# Patient Record
Sex: FEMALE | Race: ASIAN | NOT HISPANIC OR LATINO | URBAN - METROPOLITAN AREA
[De-identification: names, ages, dates, MRNs, and addresses within clinical notes are randomized per-mention and may not be internally consistent; named-entity substitution may affect disease eponyms.]

---

## 2019-03-19 PROBLEM — J34.89 NASAL OBSTRUCTION: Status: ACTIVE | Noted: 2019-03-19

## 2019-03-19 PROBLEM — J34.2 DEVIATED NASAL SEPTUM: Status: ACTIVE | Noted: 2019-03-19

## 2019-03-19 PROBLEM — J04.0 REFLUX LARYNGITIS: Status: ACTIVE | Noted: 2019-03-19

## 2019-03-19 PROBLEM — K21.9 REFLUX LARYNGITIS: Status: ACTIVE | Noted: 2019-03-19

## 2019-03-19 PROBLEM — J30.89 NON-SEASONAL ALLERGIC RHINITIS: Status: ACTIVE | Noted: 2019-03-19

## 2019-03-19 PROBLEM — J34.3 HYPERTROPHY OF BOTH INFERIOR NASAL TURBINATES: Status: ACTIVE | Noted: 2019-03-19

## 2019-04-28 PROBLEM — J31.0 RHINOSINUSITIS: Status: ACTIVE | Noted: 2019-04-28

## 2019-04-28 PROBLEM — R76.8 ANA POSITIVE: Status: ACTIVE | Noted: 2019-04-28

## 2019-04-28 PROBLEM — J32.9 RHINOSINUSITIS: Status: ACTIVE | Noted: 2019-04-28

## 2019-04-28 PROBLEM — K90.41 NCGS (NON-CELIAC GLUTEN SENSITIVITY): Status: ACTIVE | Noted: 2019-04-28

## 2022-02-23 ENCOUNTER — HOSPITAL ENCOUNTER (OUTPATIENT)
Dept: RADIOLOGY | Facility: HOSPITAL | Age: 47
Discharge: HOME | End: 2022-02-23
Payer: COMMERCIAL

## 2022-02-23 DIAGNOSIS — R09.81 CHRONIC NASAL CONGESTION: ICD-10-CM

## 2022-02-23 DIAGNOSIS — J34.2 NASAL SEPTAL DEVIATION: ICD-10-CM

## 2022-02-23 DIAGNOSIS — R51.9 CHRONIC NONINTRACTABLE HEADACHE, UNSPECIFIED HEADACHE TYPE: ICD-10-CM

## 2022-02-23 DIAGNOSIS — G89.29 CHRONIC NONINTRACTABLE HEADACHE, UNSPECIFIED HEADACHE TYPE: ICD-10-CM

## 2022-02-23 PROCEDURE — 70486 CT MAXILLOFACIAL W/O DYE: CPT

## 2022-09-16 ENCOUNTER — OFFICE VISIT (OUTPATIENT)
Dept: PODIATRY | Facility: CLINIC | Age: 47
End: 2022-09-16
Payer: COMMERCIAL

## 2022-09-16 VITALS
HEIGHT: 64 IN | HEART RATE: 97 BPM | SYSTOLIC BLOOD PRESSURE: 114 MMHG | DIASTOLIC BLOOD PRESSURE: 80 MMHG | WEIGHT: 154.4 LBS | BODY MASS INDEX: 26.36 KG/M2

## 2022-09-16 DIAGNOSIS — M72.2 PLANTAR FASCIITIS: Primary | ICD-10-CM

## 2022-09-16 PROCEDURE — 99202 OFFICE O/P NEW SF 15 MIN: CPT | Performed by: PODIATRIST

## 2022-09-16 NOTE — PATIENT INSTRUCTIONS
Powerstep Dillon 3/4 length insoles for arch support; posterior night splint for passive stretching of the plantar fascia and Achilles tendon

## 2022-09-16 NOTE — PROGRESS NOTES
Assessment/Plan:    The patient's clinical examination is consistent with plantar fasciitis of the right heel  We discussed the etiology treatment goals for plantar fasciitis  As her symptomatology appears to be relatively mild and intermittent we will proceed with conservative therapy  We discussed proper stretching exercises and proper supportive shoe gear  I recommended some over-the-counter arch supports  She may benefit from use of a posterior night splint was also recommended  Over-the-counter NSAID therapy as needed can also be helpful  Some stretching exercises were included in her aftercare summary  She will follow-up with me in 4-6 weeks for follow-up  Diagnoses and all orders for this visit:    Plantar fasciitis          Subjective:      Patient ID: Lc Gallagher is a 51 y o  female  The patient presents today with a chief complaint of right heel pain that has been present for about 1-2 months  She denies any recent injury or trauma to the right heel  But she has been involved in playing tennis and golf  She states her pain is intermittent and does not her every day but does seem to be activity related  When the pain does occurred it does tend to be worse with weight-bearing after periods of rest   She has tried stretches sizes at home, and wearing accommodative/supportive shoe gear  The following portions of the patient's history were reviewed and updated as appropriate: allergies, current medications, past family history, past medical history, past social history, past surgical history and problem list       PAST MEDICAL HISTORY:  Past Medical History:   Diagnosis Date    Allergic rhinitis        PAST SURGICAL HISTORY:  Past Surgical History:   Procedure Laterality Date     SECTION      LASIK      RETINAL DETACHMENT SURGERY      TEAR IN RETINA        ALLERGIES:  Patient has no known allergies      MEDICATIONS:  Current Outpatient Medications   Medication Sig Dispense Refill    acetaminophen (TYLENOL) 500 mg tablet Take 500 mg by mouth every 6 (six) hours as needed for mild pain or moderate pain      azelastine (ASTELIN) 0 1 % nasal spray 2 sprays into each nostril 2 (two) times a day 30 mL 11    diphenhydrAMINE-acetaminophen (TYLENOL PM)  MG TABS Take 1 tablet by mouth daily at bedtime as needed for sleep      fluticasone (FLONASE) 50 mcg/act nasal spray 1 spray in each nostril bid for 2 weeks, then 1 spray each nostril daily thereafter 1 Bottle 3    ibuprofen (MOTRIN) 400 mg tablet Take 400 mg by mouth every 6 (six) hours as needed for moderate pain      Loratadine (CLARITIN PO) Take by mouth       No current facility-administered medications for this visit  SOCIAL HISTORY:  Social History     Socioeconomic History    Marital status: /Civil Union     Spouse name: None    Number of children: None    Years of education: None    Highest education level: None   Occupational History    None   Tobacco Use    Smoking status: Never Smoker    Smokeless tobacco: None   Substance and Sexual Activity    Alcohol use: Yes    Drug use: Never    Sexual activity: None   Other Topics Concern    None   Social History Narrative    None     Social Determinants of Health     Financial Resource Strain: Not on file   Food Insecurity: Not on file   Transportation Needs: Not on file   Physical Activity: Not on file   Stress: Not on file   Social Connections: Not on file   Intimate Partner Violence: Not on file   Housing Stability: Not on file        Review of Systems   Constitutional: Negative for chills and fever  HENT: Negative for ear pain and sore throat  Eyes: Negative for pain and visual disturbance  Respiratory: Negative for cough and shortness of breath  Cardiovascular: Negative for chest pain and palpitations  Gastrointestinal: Negative for abdominal pain and vomiting  Genitourinary: Negative for dysuria and hematuria  Musculoskeletal: Negative for arthralgias and back pain  Skin: Negative for color change and rash  Neurological: Negative for seizures and syncope  Psychiatric/Behavioral: Negative  All other systems reviewed and are negative  Objective:      /80   Pulse 97   Ht 5' 4" (1 626 m) Comment: verbal  Wt 70 kg (154 lb 6 4 oz)   BMI 26 50 kg/m²          Physical Exam  Constitutional:       Appearance: Normal appearance  HENT:      Head: Normocephalic and atraumatic  Nose: Nose normal    Cardiovascular:      Pulses:           Dorsalis pedis pulses are 2+ on the right side  Posterior tibial pulses are 2+ on the right side  Pulmonary:      Effort: Pulmonary effort is normal    Musculoskeletal:        Feet:    Feet:      Comments: (+) TTP of the plantar medial tubercle of the right os calcis; no pain with lateral squeeze of the right heel, no ecchymosis, no edema, no calor, no erythema to the right foot  Skin:     General: Skin is warm  Capillary Refill: Capillary refill takes less than 2 seconds  Neurological:      General: No focal deficit present  Mental Status: She is alert and oriented to person, place, and time  Psychiatric:         Mood and Affect: Mood normal          Behavior: Behavior normal          Thought Content:  Thought content normal

## 2023-05-03 ENCOUNTER — PROCEDURE VISIT (OUTPATIENT)
Age: 48
End: 2023-05-03

## 2023-05-03 VITALS
OXYGEN SATURATION: 99 % | BODY MASS INDEX: 29.02 KG/M2 | WEIGHT: 170 LBS | DIASTOLIC BLOOD PRESSURE: 77 MMHG | HEIGHT: 64 IN | SYSTOLIC BLOOD PRESSURE: 122 MMHG | HEART RATE: 81 BPM

## 2023-05-03 DIAGNOSIS — M54.2 NECK PAIN: Primary | ICD-10-CM

## 2023-05-03 DIAGNOSIS — M54.59 MECHANICAL LOW BACK PAIN: ICD-10-CM

## 2023-05-03 DIAGNOSIS — M54.6 ACUTE RIGHT-SIDED THORACIC BACK PAIN: ICD-10-CM

## 2023-05-03 DIAGNOSIS — M79.18 MYOFASCIAL PAIN: ICD-10-CM

## 2023-05-03 RX ORDER — ATOVAQUONE AND PROGUANIL HYDROCHLORIDE 250; 100 MG/1; MG/1
1 TABLET, FILM COATED ORAL
COMMUNITY
Start: 2023-03-01

## 2023-05-03 RX ORDER — ESCITALOPRAM OXALATE 5 MG/1
5 TABLET ORAL DAILY
COMMUNITY
Start: 2023-03-27

## 2023-05-08 NOTE — PROGRESS NOTES
HPI:  Oneil West presents for treatment today increase neck upper back pain and tightness  Low back tightness right greater than left  Denies any radicular complaints to the upper or lower extremity  Denies any numbness or tingling  Denies any weakness  Reports no associated trauma or exacerbating factors however she did have a lengthy trip to [place] which seemed to have tighten things up  She is stretching on a regular basis  She is playing tennis  Denies any fever chills sweats night pain pain upon recumbency  Reports no bowel bladder changes  The following portions of the patient's history were reviewed and updated as appropriate: allergies, current medications, past family history, past medical history, past social history, past surgical history, and problem list     Review of Systems    Physical Exam:  Exam reveals active triggers in the right trapezius, levator scapula, and rhomboid musculature  Joint dysfunction T4-T5 C2-C3  Stable upper extremity root evaluation  Pelvic obliquity leg with inequality biomechanical joint dysfunction present in the right sacroiliac joint L5-S1 motion unit  Assessment:   Diagnosis ICD-10-CM Associated Orders   1  Neck pain  M54 2       2  Myofascial pain  M79 18       3  Acute right-sided thoracic back pain  M54 6       4  Mechanical low back pain  M54 59                 Treatment: 56133  Manipulation right innominate, sacrum, L5 via Kauffman drop maneuver  Manipulation T4 C5 C2 well-tolerated      Discussion:  Continue daily stretching see her back for 2-week follow-up

## 2023-06-07 ENCOUNTER — TELEPHONE (OUTPATIENT)
Age: 48
End: 2023-06-07

## 2023-06-08 ENCOUNTER — PROCEDURE VISIT (OUTPATIENT)
Age: 48
End: 2023-06-08
Payer: COMMERCIAL

## 2023-06-08 VITALS — SYSTOLIC BLOOD PRESSURE: 128 MMHG | DIASTOLIC BLOOD PRESSURE: 82 MMHG | BODY MASS INDEX: 29.18 KG/M2 | HEIGHT: 64 IN

## 2023-06-08 DIAGNOSIS — M54.59 MECHANICAL LOW BACK PAIN: ICD-10-CM

## 2023-06-08 DIAGNOSIS — M54.6 ACUTE RIGHT-SIDED THORACIC BACK PAIN: ICD-10-CM

## 2023-06-08 DIAGNOSIS — M54.2 NECK PAIN: Primary | ICD-10-CM

## 2023-06-08 DIAGNOSIS — M79.18 MYOFASCIAL PAIN: ICD-10-CM

## 2023-06-08 PROCEDURE — 98942 CHIROPRACTIC MANJ 5 REGIONS: CPT | Performed by: CHIROPRACTOR

## 2023-06-08 PROCEDURE — 97140 MANUAL THERAPY 1/> REGIONS: CPT | Performed by: CHIROPRACTOR

## 2023-06-08 NOTE — PROGRESS NOTES
"    HPI:  Carley Chung presents for treatment today increase neck upper back pain and tightness  Low back tightness right greater than left  Denies any radicular complaints to the upper or lower extremity  Denies any numbness or tingling  Denies any weakness  VPAS  3-4    She reports as of late she feels \"out of whack\"  Noticing some increased low back pain jolts when rolling over in bed  She has been doing a good number of barre classes  In addition she feels tight and stiff in the neck and upper back  Denies any fever chills sweats night pain pain upon recumbency  Reports no bowel bladder changes  The following portions of the patient's history were reviewed and updated as appropriate: allergies, current medications, past family history, past medical history, past social history, past surgical history, and problem list     Review of Systems    Physical Exam:  Exam reveals active triggers in the right trapezius, levator scapula, and rhomboid musculature  Joint dysfunction T4-T5 C2-C3  Stable upper extremity root evaluation  Pelvic obliquity leg with inequality biomechanical joint dysfunction present in the right sacroiliac joint L5-S1 motion unit  Assessment:   Diagnosis ICD-10-CM Associated Orders   1  Neck pain  M54 2       2  Myofascial pain  M79 18       3  Acute right-sided thoracic back pain  M54 6       4  Mechanical low back pain  M54 59                 Treatment: [id number]  Manipulation right innominate, sacrum, L5 via Kauffman drop maneuver  Manipulation T4 C5 C2 well-tolerated  Myofascial release therapeutically to the bilateral shoulder girdles neck and thoracic spine utilizing Graston technique  I used GT 2, GT 3, and GT 4 instruments  These were all well-tolerated  Soft tissues were mobilized and addressed well-tolerated by the patient today 12-minute procedure      Discussion:  Continue daily stretching see her back for 4-week follow-up  "

## 2023-07-06 ENCOUNTER — PROCEDURE VISIT (OUTPATIENT)
Age: 48
End: 2023-07-06
Payer: COMMERCIAL

## 2023-07-06 VITALS — SYSTOLIC BLOOD PRESSURE: 114 MMHG | BODY MASS INDEX: 29.18 KG/M2 | HEIGHT: 64 IN | DIASTOLIC BLOOD PRESSURE: 78 MMHG

## 2023-07-06 DIAGNOSIS — M54.59 MECHANICAL LOW BACK PAIN: ICD-10-CM

## 2023-07-06 DIAGNOSIS — S76.312A STRAIN OF LEFT HAMSTRING MUSCLE, INITIAL ENCOUNTER: ICD-10-CM

## 2023-07-06 DIAGNOSIS — M54.2 NECK PAIN: Primary | ICD-10-CM

## 2023-07-06 DIAGNOSIS — M79.18 MYOFASCIAL PAIN: ICD-10-CM

## 2023-07-06 PROCEDURE — 98941 CHIROPRACT MANJ 3-4 REGIONS: CPT | Performed by: CHIROPRACTOR

## 2023-07-06 PROCEDURE — 97140 MANUAL THERAPY 1/> REGIONS: CPT | Performed by: CHIROPRACTOR

## 2023-07-06 NOTE — PROGRESS NOTES
HPI:  Arik Felton returns for treatment today. She reports increased symptoms neck upper back and lower back secondary to recently straining her left hamstring. She was playing tennis felt a slight pop in the left superior hamstring. Overall she is definitely better than when she did this about a week ago but still notes some changes in gait as well as ability to move about in bed. Denies any weaknesses denies any numbness or tingling denies any swelling or lower extremity issues. She has been icing daily light stretching walking as tolerated. The following portions of the patient's history were reviewed and updated as appropriate: allergies, current medications, past family history, past medical history, past social history, past surgical history, and problem list.    Review of Systems    Physical Exam:  Exam of the left hamstring reveals no palpable masses or deficits. She is tender upon palpation of the superior origin of the hamstring at the ischial tuberosity. Once again no deficits palpated. Range of motion of the lower extremity is full without issue. She does have some weakness in full leg flexion from the extended knee position she has 5 or 5 strength mid flexion as well as fully flexed. Pelvic obliquity leg with inequality biomechanical joint dysfunction right SI L5-S1 motion unit there is some quadratus lumborum hypertonicity noted on the right. Active triggers bilateral trapezii lever scapula rhomboid T4 T5-T6-T7 segmental dysfunction is identified. Assessment:   Diagnosis ICD-10-CM Associated Orders   1. Neck pain  M54.2       2. Myofascial pain  M79.18       3. Mechanical low back pain  M54.59       4. Strain of left hamstring muscle, initial encounter  E34.195E                 Treatment: 36812  Manipulation to the right innominate, sacrum, L5 via Kauffman drop maneuver well-tolerated. Manipulation T6 T4 well-tolerated.     Myofascial release to the bilateral trapezii left scapula rhomboid and bilateral shoulder girdles utilizing Graston technique. I used GT 2, GT 3, and GT 4 instruments. Scap thoracic mobilizations performed tolerated well by the patient 12-minute procedure.     Discussion:  Reviewed home care instructions she will continue to gently stretch mobilize as tolerated icing every day 2-week recheck

## 2023-07-21 ENCOUNTER — PROCEDURE VISIT (OUTPATIENT)
Age: 48
End: 2023-07-21
Payer: COMMERCIAL

## 2023-07-21 VITALS — SYSTOLIC BLOOD PRESSURE: 119 MMHG | DIASTOLIC BLOOD PRESSURE: 84 MMHG | HEART RATE: 79 BPM

## 2023-07-21 DIAGNOSIS — M79.18 MYOFASCIAL PAIN: ICD-10-CM

## 2023-07-21 DIAGNOSIS — S76.312A STRAIN OF LEFT HAMSTRING MUSCLE, INITIAL ENCOUNTER: ICD-10-CM

## 2023-07-21 DIAGNOSIS — M54.2 NECK PAIN: Primary | ICD-10-CM

## 2023-07-21 DIAGNOSIS — M54.59 MECHANICAL LOW BACK PAIN: ICD-10-CM

## 2023-07-21 PROCEDURE — 98941 CHIROPRACT MANJ 3-4 REGIONS: CPT | Performed by: CHIROPRACTOR

## 2023-07-21 PROCEDURE — 97140 MANUAL THERAPY 1/> REGIONS: CPT | Performed by: CHIROPRACTOR

## 2023-07-21 NOTE — PROGRESS NOTES
HPI:  Arik Felton returns for treatment today. She notes relative improvement in her condition. Her hamstring is feeling better although not completely resolved. She is doing light walking without incident still some tenderness superior hamstring. Describes that her back is more sore over the past week. The following portions of the patient's history were reviewed and updated as appropriate: allergies, current medications, past family history, past medical history, past social history, past surgical history, and problem list.    Review of Systems    Physical Exam:  Exam of the left hamstring still reveals tenderness upon palpation the superior portion no divots are realized motor strength is 5/5 however painful especially in the knee flexed testing position. .    Pelvic obliquity leg with inequality biomechanical joint dysfunction right SI L5-S1 motion unit there is some quadratus lumborum hypertonicity noted on the right. Active triggers bilateral trapezii lever scapula rhomboid T4 T5-T6-T7 segmental dysfunction is identified. Assessment:   Diagnosis ICD-10-CM Associated Orders   1. Neck pain  M54.2       2. Myofascial pain  M79.18       3. Mechanical low back pain  M54.59       4. Strain of left hamstring muscle, initial encounter  Z25.819J                 Treatment: 59193  Manipulation to the right innominate, sacrum, L5 via Kauffman drop maneuver well-tolerated. Manipulation T6 T4 well-tolerated. Myofascial release to the left hamstring utilizing light movements. I used GT 2 GT 3 and GT 4 instruments. Patient tolerated the treatment well and gentle stretching was performed. This 12-minute procedure.     Discussion:  Reviewed home care instructions she will continue to gently stretch mobilize as tolerated icing every day 2-week recheck

## 2023-08-04 ENCOUNTER — PROCEDURE VISIT (OUTPATIENT)
Age: 48
End: 2023-08-04
Payer: COMMERCIAL

## 2023-08-04 VITALS
SYSTOLIC BLOOD PRESSURE: 121 MMHG | HEART RATE: 75 BPM | DIASTOLIC BLOOD PRESSURE: 71 MMHG | BODY MASS INDEX: 29.02 KG/M2 | HEIGHT: 64 IN | WEIGHT: 170 LBS

## 2023-08-04 DIAGNOSIS — M54.2 NECK PAIN: Primary | ICD-10-CM

## 2023-08-04 DIAGNOSIS — M79.18 MYOFASCIAL PAIN: ICD-10-CM

## 2023-08-04 DIAGNOSIS — M54.59 MECHANICAL LOW BACK PAIN: ICD-10-CM

## 2023-08-04 PROCEDURE — 97140 MANUAL THERAPY 1/> REGIONS: CPT | Performed by: CHIROPRACTOR

## 2023-08-04 PROCEDURE — 98941 CHIROPRACT MANJ 3-4 REGIONS: CPT | Performed by: CHIROPRACTOR

## 2023-08-04 NOTE — PROGRESS NOTES
HPI:  Norman Sanders returns for treatment today. She notes relative improvement in her condition. Neck and upper back feel better. No symptoms into the hamstring with light walking actually had a vacation over the past week different beds and traveling without incident. The following portions of the patient's history were reviewed and updated as appropriate: allergies, current medications, past family history, past medical history, past social history, past surgical history, and problem list.    Review of Systems    Physical Exam:  Exam of the left hamstring reveals no significant tenderness upon palpation moderate myofascial involvement of the inferior hamstring above the knee. She has full range of motion about the hip and knee without incident. Motor strength is 5/5. Pelvic obliquity leg with inequality biomechanical joint dysfunction right SI L5-S1 motion unit there is some quadratus lumborum hypertonicity noted on the right. Active triggers bilateral trapezii lever scapula rhomboid T4 T5-T6-T7 segmental dysfunction is identified. Assessment:   Diagnosis ICD-10-CM Associated Orders   1. Neck pain  M54.2       2. Myofascial pain  M79.18       3. Mechanical low back pain  M54.59                 Treatment: 17624  Manipulation to the right innominate, sacrum, L5 via Kauffman drop maneuver well-tolerated. Manipulation T6 T4 well-tolerated. Myofascial release to the left hamstring utilizing light movements. I used GT 2 GT 3 and GT 4 instruments. Patient tolerated the treatment well and gentle stretching was performed. This 12-minute procedure.     Discussion:  Reviewed home care instructions she will continue to gently stretch mobilize as tolerated icing every day 2-week recheck

## 2023-08-18 ENCOUNTER — PROCEDURE VISIT (OUTPATIENT)
Age: 48
End: 2023-08-18
Payer: COMMERCIAL

## 2023-08-18 VITALS
DIASTOLIC BLOOD PRESSURE: 74 MMHG | BODY MASS INDEX: 29.02 KG/M2 | SYSTOLIC BLOOD PRESSURE: 94 MMHG | HEART RATE: 78 BPM | HEIGHT: 64 IN | WEIGHT: 170 LBS

## 2023-08-18 DIAGNOSIS — M54.59 MECHANICAL LOW BACK PAIN: ICD-10-CM

## 2023-08-18 DIAGNOSIS — M79.18 MYOFASCIAL PAIN: ICD-10-CM

## 2023-08-18 DIAGNOSIS — M54.2 NECK PAIN: Primary | ICD-10-CM

## 2023-08-18 PROCEDURE — 98941 CHIROPRACT MANJ 3-4 REGIONS: CPT | Performed by: CHIROPRACTOR

## 2023-08-18 NOTE — PROGRESS NOTES
HPI:  Rina Cook returns for treatment today. She notes relative improvement in her condition. Neck and upper back feel better. Hamstring is feeling better. The following portions of the patient's history were reviewed and updated as appropriate: allergies, current medications, past family history, past medical history, past social history, past surgical history, and problem list.    Review of Systems    Physical Exam:  Exam of the left hamstring reveals no significant tenderness upon palpation mild myofascial involvement of the inferior hamstring above the knee. She has full range of motion about the hip and knee without incident. Motor strength is 5/5. Pelvic obliquity leg with inequality biomechanical joint dysfunction right SI L5-S1 motion unit there is some quadratus lumborum hypertonicity noted on the right. Active triggers bilateral trapezii lever scapula rhomboid T4 T5-T6-T7 segmental dysfunction is identified. Facet joint dysfunction C5-C6 left lateral bending. Assessment:   Diagnosis ICD-10-CM Associated Orders   1. Neck pain  M54.2       2. Myofascial pain  M79.18       3. Mechanical low back pain  M54.59                 Treatment: 85852  Manipulation to the right innominate, sacrum, L5 via Kauffman drop maneuver well-tolerated. Manipulation T6 T4 well-tolerated.   Manipulation C5 well-tolerated      Discussion:  Reviewed home care instructions she will continue to gently stretch mobilize as tolerated icing every day 2-week recheck

## 2023-09-07 ENCOUNTER — PROCEDURE VISIT (OUTPATIENT)
Age: 48
End: 2023-09-07
Payer: COMMERCIAL

## 2023-09-07 VITALS — BODY MASS INDEX: 29.02 KG/M2 | HEIGHT: 64 IN | WEIGHT: 170 LBS

## 2023-09-07 DIAGNOSIS — S76.312A STRAIN OF LEFT HAMSTRING MUSCLE, INITIAL ENCOUNTER: ICD-10-CM

## 2023-09-07 DIAGNOSIS — M54.59 MECHANICAL LOW BACK PAIN: ICD-10-CM

## 2023-09-07 DIAGNOSIS — M54.2 NECK PAIN: Primary | ICD-10-CM

## 2023-09-07 DIAGNOSIS — M79.18 MYOFASCIAL PAIN: ICD-10-CM

## 2023-09-07 PROCEDURE — 98941 CHIROPRACT MANJ 3-4 REGIONS: CPT | Performed by: CHIROPRACTOR

## 2023-09-07 NOTE — PROGRESS NOTES
HPI:  Jimbo Grider returns for treatment today. Reports a few days ago that she had a "crick" in the neck when twisting and turning to the right. Feels stiff and tight upper back. She has returned to tennis however still tentative. Hamstring has progressed well. She still feels "off" in regards to her pelvis and low back. The following portions of the patient's history were reviewed and updated as appropriate: allergies, current medications, past family history, past medical history, past social history, past surgical history, and problem list.    Review of Systems    Physical Exam:      Pelvic obliquity leg with inequality biomechanical joint dysfunction right SI L5-S1 motion unit there is some quadratus lumborum hypertonicity noted on the right. Active triggers bilateral trapezii lever scapula rhomboid T4 T5-T6-T7 segmental dysfunction is identified. Facet joint dysfunction C5-C6 left lateral bending. Assessment:   Diagnosis ICD-10-CM Associated Orders   1. Neck pain  M54.2       2. Myofascial pain  M79.18       3. Mechanical low back pain  M54.59       4. Strain of left hamstring muscle, initial encounter  N02.034L                 Treatment: [id number]  Manipulation to the right innominate, sacrum, L5 via Kauffman drop maneuver well-tolerated. Manipulation T6 T4 well-tolerated.   Manipulation C5 well-tolerated      Discussion:  Reviewed home care instructions she will continue to gently stretch mobilize as tolerated icing every day 2-week recheck

## 2023-09-19 ENCOUNTER — PROCEDURE VISIT (OUTPATIENT)
Age: 48
End: 2023-09-19
Payer: COMMERCIAL

## 2023-09-19 VITALS
DIASTOLIC BLOOD PRESSURE: 77 MMHG | WEIGHT: 170 LBS | HEIGHT: 64 IN | SYSTOLIC BLOOD PRESSURE: 110 MMHG | BODY MASS INDEX: 29.02 KG/M2 | HEART RATE: 78 BPM

## 2023-09-19 DIAGNOSIS — M54.59 MECHANICAL LOW BACK PAIN: ICD-10-CM

## 2023-09-19 DIAGNOSIS — M79.18 MYOFASCIAL PAIN: ICD-10-CM

## 2023-09-19 DIAGNOSIS — M54.2 NECK PAIN: Primary | ICD-10-CM

## 2023-09-19 PROCEDURE — 98941 CHIROPRACT MANJ 3-4 REGIONS: CPT | Performed by: CHIROPRACTOR

## 2023-09-19 NOTE — PROGRESS NOTES
HPI:  Saleem Henry returns for treatment today. Reports neck pain stiffness into the bilateral shoulders to visual pain analog scale. Low back achiness and tightness on the left pain a 4 on a visual pain analog scale. Denies any radicular symptoms. The following portions of the patient's history were reviewed and updated as appropriate: allergies, current medications, past family history, past medical history, past social history, past surgical history, and problem list.    Review of Systems    Physical Exam:    Pelvic obliquity leg with inequality are not identified there is an elevated left versus right innominate tenderness over the left parasacral region some hypertonicity noted of the erector spinae progressive or musculature on the left. Biomechanically joint dysfunction left SI L5-S1 motion unit. Paracervical hypertonicity noted with tightness of the left superior trapezius and levator scapula biomechanically joint dysfunction C5-C6 and extension right lateral bending right rotation. Assessment:   Diagnosis ICD-10-CM Associated Orders   1. Neck pain  M54.2       2. Myofascial pain  M79.18       3. Mechanical low back pain  M54.59                 Treatment: [id number]  Manipulation to the left innominate, sacrum, L5 via Kauffman drop maneuver. Manipulation C5 via right-sided modified rotary break well-tolerated. Discussion:  She is doing better in regards to her hamstring strain. Cautioned about the importance of regular stretching now that she has resumed activities. She will concentrate on core exercises to strengthen the low back as well as posterior shoulder exercises for strengthening neck and upper back recheck in 1 month.

## 2023-10-10 ENCOUNTER — PROCEDURE VISIT (OUTPATIENT)
Age: 48
End: 2023-10-10
Payer: COMMERCIAL

## 2023-10-10 VITALS
HEIGHT: 64 IN | DIASTOLIC BLOOD PRESSURE: 80 MMHG | SYSTOLIC BLOOD PRESSURE: 112 MMHG | WEIGHT: 170 LBS | BODY MASS INDEX: 29.02 KG/M2 | HEART RATE: 66 BPM

## 2023-10-10 DIAGNOSIS — M79.18 MYOFASCIAL PAIN: ICD-10-CM

## 2023-10-10 DIAGNOSIS — M54.2 NECK PAIN: Primary | ICD-10-CM

## 2023-10-10 DIAGNOSIS — M54.59 MECHANICAL LOW BACK PAIN: ICD-10-CM

## 2023-10-10 PROCEDURE — 98941 CHIROPRACT MANJ 3-4 REGIONS: CPT | Performed by: CHIROPRACTOR

## 2023-10-10 NOTE — PROGRESS NOTES
HPI:  Pawel Huddleston returns for treatment today. Reports neck pain stiffness into the bilateral shoulders. 3  VPAS. Low back achiness and tightness on the left pain a 0 on a visual pain analog scale. Denies any radicular symptoms. She is just recovering from a bout of herpes zoster mid thoracic spine is feeling better to this and. Returning to her exercise program as before. The following portions of the patient's history were reviewed and updated as appropriate: allergies, current medications, past family history, past medical history, past social history, past surgical history, and problem list.    Review of Systems    Physical Exam:    Pelvic obliquity leg with inequality are not identified there is an elevated left versus right innominate tenderness over the left parasacral region some hypertonicity noted of the erector spinae progressive or musculature on the left. Biomechanically joint dysfunction left SI L5-S1 motion unit. Paracervical hypertonicity noted with tightness of the left superior trapezius and levator scapula biomechanically joint dysfunction C5-C6 and extension right lateral bending right rotation. Assessment:   Diagnosis ICD-10-CM Associated Orders   1. Neck pain  M54.2       2. Myofascial pain  M79.18       3. Mechanical low back pain  M54.59                 Treatment: 10639  Manipulation to the left innominate, sacrum, L5 via Kauffman drop maneuver. Manipulation C5 via left-sided modified rotary break well-tolerated. Discussion:  Resume exercise program as before. Thorough warm ups prior to tennis, she has a great understanding.

## 2023-11-02 ENCOUNTER — PROCEDURE VISIT (OUTPATIENT)
Age: 48
End: 2023-11-02
Payer: COMMERCIAL

## 2023-11-02 VITALS — BODY MASS INDEX: 29.02 KG/M2 | WEIGHT: 170 LBS | HEIGHT: 64 IN

## 2023-11-02 DIAGNOSIS — M79.18 MYOFASCIAL PAIN: ICD-10-CM

## 2023-11-02 DIAGNOSIS — M54.2 NECK PAIN: Primary | ICD-10-CM

## 2023-11-02 DIAGNOSIS — M54.6 ACUTE RIGHT-SIDED THORACIC BACK PAIN: ICD-10-CM

## 2023-11-02 DIAGNOSIS — M54.59 MECHANICAL LOW BACK PAIN: ICD-10-CM

## 2023-11-02 PROCEDURE — 98941 CHIROPRACT MANJ 3-4 REGIONS: CPT | Performed by: CHIROPRACTOR

## 2023-11-06 NOTE — PROGRESS NOTES
HPI:  Melissa Gallegos returns for treatment today. Reports neck pain stiffness into the bilateral shoulders. 3  VPAS. Low back achiness and tightness on the left pain a 2-3 on a visual pain analog scale. Denies any radicular symptoms. Returning to her exercise program as before. The following portions of the patient's history were reviewed and updated as appropriate: allergies, current medications, past family history, past medical history, past social history, past surgical history, and problem list.    Review of Systems    Physical Exam:    Pelvic obliquity leg with inequality are not identified there is an elevated left versus right innominate tenderness over the left parasacral region some hypertonicity noted of the erector spinae progressive or musculature on the left. Biomechanically joint dysfunction left SI L5-S1 motion unit. Paracervical hypertonicity noted with tightness of the left superior trapezius and levator scapula biomechanically joint dysfunction C5-C6 and extension right lateral bending right rotation. Assessment:   Diagnosis ICD-10-CM Associated Orders   1. Neck pain  M54.2       2. Myofascial pain  M79.18       3. Acute right-sided thoracic back pain  M54.6       4. Mechanical low back pain  M54.59                 Treatment: 24989  Manipulation to the left innominate, sacrum, L5 via Kauffman drop maneuver. Manipulation C5 via left-sided modified rotary break well-tolerated. Discussion:  Resume exercise program as before. Thorough warm ups prior to tennis, she has a great understanding.

## 2023-11-28 ENCOUNTER — PROCEDURE VISIT (OUTPATIENT)
Age: 48
End: 2023-11-28
Payer: COMMERCIAL

## 2023-11-28 VITALS — BODY MASS INDEX: 29.02 KG/M2 | WEIGHT: 170 LBS | HEIGHT: 64 IN

## 2023-11-28 DIAGNOSIS — M54.6 ACUTE RIGHT-SIDED THORACIC BACK PAIN: ICD-10-CM

## 2023-11-28 DIAGNOSIS — M79.18 MYOFASCIAL PAIN: ICD-10-CM

## 2023-11-28 DIAGNOSIS — M54.2 NECK PAIN: Primary | ICD-10-CM

## 2023-11-28 PROCEDURE — 98941 CHIROPRACT MANJ 3-4 REGIONS: CPT | Performed by: CHIROPRACTOR

## 2023-11-28 NOTE — PROGRESS NOTES
HPI:  Abisai Buchanan returns for treatment today. Reports neck pain stiffness into the bilateral shoulders. 2 VPAS. Low back achiness and tightness on the left pain a 2 on a visual pain analog scale. Denies any radicular symptoms. Returning to her exercise program as before. The following portions of the patient's history were reviewed and updated as appropriate: allergies, current medications, past family history, past medical history, past social history, past surgical history, and problem list.    Review of Systems    Physical Exam:    Pelvic obliquity leg with inequality are not identified there is an elevated left versus right innominate tenderness over the left parasacral region some hypertonicity noted of the erector spinae progressive or musculature on the left. Biomechanically joint dysfunction left SI L5-S1 motion unit. Paracervical hypertonicity noted with tightness of the left superior trapezius and levator scapula biomechanically joint dysfunction C5-C6 and extension right lateral bending right rotation. Assessment:   Diagnosis ICD-10-CM Associated Orders   1. Neck pain  M54.2       2. Myofascial pain  M79.18       3. Acute right-sided thoracic back pain  M54.6                 Treatment: 49187  Manipulation to the left innominate, sacrum, L5 via Kauffman drop maneuver. Manipulation C5 via left-sided modified rotary break well-tolerated. Discussion:  Resume exercise program as before. Thorough warm ups prior to tennis, she has a great understanding.

## 2024-01-11 ENCOUNTER — TELEPHONE (OUTPATIENT)
Age: 49
End: 2024-01-11

## 2024-01-11 NOTE — TELEPHONE ENCOUNTER
Called pt to let know insurance is [employer] with a 30 visit limit for the year with a $35.00 copay each visit.

## 2024-01-15 ENCOUNTER — PROCEDURE VISIT (OUTPATIENT)
Age: 49
End: 2024-01-15
Payer: COMMERCIAL

## 2024-01-15 VITALS
DIASTOLIC BLOOD PRESSURE: 82 MMHG | BODY MASS INDEX: 29.02 KG/M2 | SYSTOLIC BLOOD PRESSURE: 113 MMHG | WEIGHT: 170 LBS | HEIGHT: 64 IN

## 2024-01-15 DIAGNOSIS — M54.59 MECHANICAL LOW BACK PAIN: ICD-10-CM

## 2024-01-15 DIAGNOSIS — M54.2 NECK PAIN: Primary | ICD-10-CM

## 2024-01-15 DIAGNOSIS — M54.6 ACUTE RIGHT-SIDED THORACIC BACK PAIN: ICD-10-CM

## 2024-01-15 DIAGNOSIS — M79.18 MYOFASCIAL PAIN: ICD-10-CM

## 2024-01-15 PROCEDURE — 98941 CHIROPRACT MANJ 3-4 REGIONS: CPT | Performed by: CHIROPRACTOR

## 2024-01-15 NOTE — PROGRESS NOTES
HPI:  Lillian returns for treatment today.  She reports some low back and mid back stiffness.  Was traveling 3 weeks in [place] has just returned resuming her exercise routine.  3 VPAS.    The following portions of the patient's history were reviewed and updated as appropriate: allergies, current medications, past family history, past medical history, past social history, past surgical history, and problem list.    Review of Systems    Physical Exam:    Pelvic obliquity leg with inequality are not identified there is an elevated left versus right innominate tenderness over the left parasacral region some hypertonicity noted of the erector spinae progressive or musculature on the left.  Biomechanically joint dysfunction left SI L5-S1 motion unit.    Paracervical hypertonicity noted with tightness of the left superior trapezius and levator scapula biomechanically joint dysfunction C5-C6 and extension right lateral bending right rotation.      Assessment:   Diagnosis ICD-10-CM Associated Orders   1. Neck pain  M54.2       2. Myofascial pain  M79.18       3. Acute right-sided thoracic back pain  M54.6       4. Mechanical low back pain  M54.59                 Treatment: 08976  Manipulation to the left innominate, sacrum, L5 via Kauffman drop maneuver.  Manipulation C5 via left-sided modified rotary break well-tolerated.      Discussion:  Resume exercise program as before.  Thorough warm ups prior to tennis, she has a great understanding.

## 2024-02-06 ENCOUNTER — PROCEDURE VISIT (OUTPATIENT)
Age: 49
End: 2024-02-06
Payer: COMMERCIAL

## 2024-02-06 VITALS
BODY MASS INDEX: 29.02 KG/M2 | HEIGHT: 64 IN | HEART RATE: 79 BPM | SYSTOLIC BLOOD PRESSURE: 114 MMHG | WEIGHT: 170 LBS | DIASTOLIC BLOOD PRESSURE: 82 MMHG

## 2024-02-06 DIAGNOSIS — M54.6 ACUTE RIGHT-SIDED THORACIC BACK PAIN: ICD-10-CM

## 2024-02-06 DIAGNOSIS — M54.2 NECK PAIN: Primary | ICD-10-CM

## 2024-02-06 DIAGNOSIS — M79.18 MYOFASCIAL PAIN: ICD-10-CM

## 2024-02-06 PROCEDURE — 98941 CHIROPRACT MANJ 3-4 REGIONS: CPT | Performed by: CHIROPRACTOR

## 2024-02-08 NOTE — PROGRESS NOTES
HPI:  Lillian returns for treatment today.  She reports some low back and mid back stiffness.  Notes left-sided plantar fascial pain.  Playing tennis on a regular basis stretching on a daily basis.      The following portions of the patient's history were reviewed and updated as appropriate: allergies, current medications, past family history, past medical history, past social history, past surgical history, and problem list.    Review of Systems    Physical Exam:    Pelvic obliquity leg with inequality are not identified there is an elevated left versus right innominate tenderness over the left parasacral region some hypertonicity noted of the erector spinae progressive or musculature on the left.  Biomechanically joint dysfunction left SI L5-S1 motion unit.    Paracervical hypertonicity noted with tightness of the left superior trapezius and levator scapula biomechanically joint dysfunction C5-C6 and extension right lateral bending right rotation.      Assessment:   Diagnosis ICD-10-CM Associated Orders   1. Neck pain  M54.2       2. Myofascial pain  M79.18       3. Acute right-sided thoracic back pain  M54.6                 Treatment: 95106  Manipulation to the left innominate, sacrum, L5 via Kauffman drop maneuver.  Manipulation C5 via left-sided modified rotary break well-tolerated.      Discussion:  Resume exercise program as before.  Thorough warm ups prior to tennis, she has a great understanding.  Concentrate on heel cord stretches.

## 2024-02-21 PROBLEM — J32.9 RHINOSINUSITIS: Status: RESOLVED | Noted: 2019-04-28 | Resolved: 2024-02-21

## 2024-02-22 ENCOUNTER — ESTABLISHED COMPREHENSIVE EXAM (OUTPATIENT)
Dept: URBAN - METROPOLITAN AREA CLINIC 6 | Facility: CLINIC | Age: 49
End: 2024-02-22

## 2024-02-22 DIAGNOSIS — H25.813: ICD-10-CM

## 2024-02-22 DIAGNOSIS — H52.4: ICD-10-CM

## 2024-02-22 DIAGNOSIS — H52.203: ICD-10-CM

## 2024-02-22 DIAGNOSIS — H04.123: ICD-10-CM

## 2024-02-22 DIAGNOSIS — Z98.890: ICD-10-CM

## 2024-02-22 DIAGNOSIS — H52.13: ICD-10-CM

## 2024-02-22 PROCEDURE — 92014 COMPRE OPH EXAM EST PT 1/>: CPT

## 2024-02-22 PROCEDURE — 92015 DETERMINE REFRACTIVE STATE: CPT

## 2024-02-22 ASSESSMENT — TONOMETRY
OS_IOP_MMHG: 10
OD_IOP_MMHG: 13

## 2024-02-22 ASSESSMENT — VISUAL ACUITY
OS_SC: 20/40-2
OU_SC: J1
OS_PH: 20/20
OD_SC: 20/20-1

## 2024-03-05 ENCOUNTER — PROCEDURE VISIT (OUTPATIENT)
Age: 49
End: 2024-03-05
Payer: COMMERCIAL

## 2024-03-05 VITALS — BODY MASS INDEX: 29.02 KG/M2 | HEIGHT: 64 IN | WEIGHT: 170 LBS

## 2024-03-05 DIAGNOSIS — M54.59 MECHANICAL LOW BACK PAIN: ICD-10-CM

## 2024-03-05 DIAGNOSIS — M79.18 MYOFASCIAL PAIN: ICD-10-CM

## 2024-03-05 DIAGNOSIS — M54.2 NECK PAIN: Primary | ICD-10-CM

## 2024-03-05 DIAGNOSIS — M54.6 ACUTE RIGHT-SIDED THORACIC BACK PAIN: ICD-10-CM

## 2024-03-05 PROCEDURE — 98941 CHIROPRACT MANJ 3-4 REGIONS: CPT | Performed by: CHIROPRACTOR

## 2024-03-05 RX ORDER — MELOXICAM 15 MG/1
15 TABLET ORAL DAILY
COMMUNITY
Start: 2024-02-14

## 2024-03-10 NOTE — PROGRESS NOTES
HPI:  Lillian returns for treatment today.  She reports some low back and mid back stiffness.  Notes left-sided plantar fascial pain.  Playing tennis on a regular basis stretching on a daily basis.    VPAS  3  neck      The following portions of the patient's history were reviewed and updated as appropriate: allergies, current medications, past family history, past medical history, past social history, past surgical history, and problem list.    Review of Systems    Physical Exam:    Pelvic obliquity leg with inequality are not identified there is an elevated left versus right innominate tenderness over the left parasacral region some hypertonicity noted of the erector spinae progressive or musculature on the left.  Biomechanically joint dysfunction left SI L5-S1 motion unit.    Paracervical hypertonicity noted with tightness of the left superior trapezius and levator scapula biomechanically joint dysfunction C5-C6 and extension right lateral bending right rotation.      Assessment:   Diagnosis ICD-10-CM Associated Orders   1. Neck pain  M54.2       2. Myofascial pain  M79.18       3. Acute right-sided thoracic back pain  M54.6       4. Mechanical low back pain  M54.59                 Treatment: 16198  Manipulation to the left innominate, sacrum, L5 via Kauffman drop maneuver.  Manipulation C5 via left-sided modified rotary break well-tolerated.      Discussion:  Resume exercise program as before.  Thorough warm ups prior to tennis, she has a great understanding.  Concentrate on heel cord stretches.

## 2024-04-24 ENCOUNTER — PROCEDURE VISIT (OUTPATIENT)
Age: 49
End: 2024-04-24
Payer: COMMERCIAL

## 2024-04-24 VITALS
HEIGHT: 64 IN | SYSTOLIC BLOOD PRESSURE: 113 MMHG | HEART RATE: 71 BPM | DIASTOLIC BLOOD PRESSURE: 71 MMHG | BODY MASS INDEX: 29.02 KG/M2 | WEIGHT: 170 LBS

## 2024-04-24 DIAGNOSIS — M79.18 MYOFASCIAL PAIN: ICD-10-CM

## 2024-04-24 DIAGNOSIS — M54.2 NECK PAIN: Primary | ICD-10-CM

## 2024-04-24 DIAGNOSIS — M54.59 MECHANICAL LOW BACK PAIN: ICD-10-CM

## 2024-04-24 DIAGNOSIS — M54.6 ACUTE RIGHT-SIDED THORACIC BACK PAIN: ICD-10-CM

## 2024-04-24 PROCEDURE — 98941 CHIROPRACT MANJ 3-4 REGIONS: CPT | Performed by: CHIROPRACTOR

## 2024-04-28 NOTE — PROGRESS NOTES
HPI:  Lillian returns for treatment today.  She reports some low back and mid back stiffness.   Playing tennis on a regular basis stretching on a daily basis.  Overall feels considerably better.    VPAS  1      The following portions of the patient's history were reviewed and updated as appropriate: allergies, current medications, past family history, past medical history, past social history, past surgical history, and problem list.    Review of Systems    Physical Exam:    Pelvic obliquity leg with inequality are not identified there is an elevated left versus right innominate tenderness over the left parasacral region some hypertonicity noted of the erector spinae progressive or musculature on the left.  Biomechanically joint dysfunction left SI L5-S1 motion unit.    Paracervical hypertonicity noted with tightness of the left superior trapezius and levator scapula biomechanically joint dysfunction C5-C6 and extension right lateral bending right rotation.      Assessment:   Diagnosis ICD-10-CM Associated Orders   1. Neck pain  M54.2       2. Myofascial pain  M79.18       3. Acute right-sided thoracic back pain  M54.6       4. Mechanical low back pain  M54.59                 Treatment: 80411  Manipulation to the left innominate, sacrum, L5 via Kauffman drop maneuver.  Manipulation C5 via left-sided modified rotary break well-tolerated.      Discussion:  Resume exercise program as before.  See her back 4 to 6 weeks or as needed.

## 2025-02-03 ENCOUNTER — ESTABLISHED COMPREHENSIVE EXAM (OUTPATIENT)
Dept: URBAN - METROPOLITAN AREA CLINIC 6 | Facility: CLINIC | Age: 50
End: 2025-02-03

## 2025-02-03 DIAGNOSIS — H52.13: ICD-10-CM

## 2025-02-03 DIAGNOSIS — H04.123: ICD-10-CM

## 2025-02-03 DIAGNOSIS — Z98.890: ICD-10-CM

## 2025-02-03 DIAGNOSIS — H25.813: ICD-10-CM

## 2025-02-03 PROCEDURE — 92014 COMPRE OPH EXAM EST PT 1/>: CPT

## 2025-02-03 PROCEDURE — 92015 DETERMINE REFRACTIVE STATE: CPT

## 2025-02-03 ASSESSMENT — TONOMETRY
OS_IOP_MMHG: 12
OD_IOP_MMHG: 12

## 2025-02-03 ASSESSMENT — VISUAL ACUITY
OD_CC: 20/20
OS_CC: 20/20